# Patient Record
Sex: FEMALE | Race: WHITE | Employment: UNEMPLOYED | URBAN - METROPOLITAN AREA
[De-identification: names, ages, dates, MRNs, and addresses within clinical notes are randomized per-mention and may not be internally consistent; named-entity substitution may affect disease eponyms.]

---

## 2017-05-08 ENCOUNTER — ALLIED HEALTH/NURSE VISIT (OUTPATIENT)
Dept: PEDIATRICS | Facility: CLINIC | Age: 22
End: 2017-05-08
Payer: COMMERCIAL

## 2017-05-08 ENCOUNTER — TELEPHONE (OUTPATIENT)
Dept: PEDIATRICS | Facility: CLINIC | Age: 22
End: 2017-05-08

## 2017-05-08 DIAGNOSIS — Z11.1 SCREENING EXAMINATION FOR PULMONARY TUBERCULOSIS: ICD-10-CM

## 2017-05-08 DIAGNOSIS — Z11.1 VISIT FOR MANTOUX TEST: Primary | ICD-10-CM

## 2017-05-08 PROCEDURE — 99207 ZZC NO CHARGE NURSE ONLY: CPT

## 2017-05-08 NOTE — NURSING NOTE
The patient is asked the following questions today and these are her answers:    -Have you had a mantoux administered in the past 30 days?    No  -Have you had a previous positive Mantoux.  Yes  -Have you received BCG in the past.  No  -Have you had a live vaccine  (MMR, Varicella, OPV, Yellow Fever) in the last 6 weeks.  No  -Have you had and active  viral or bacterial infection in the past 6 weeks.  No  -Have you received corticosteroids or immunosuppressive agents in the past 6 weeks.  No  -Have you been diagnosed with HIV?  No  -Do you have a maglinancy?  No     Radha Esposito CMA

## 2017-05-08 NOTE — PROGRESS NOTES
Katia Cotto comes into clinic today at the request of Dr. Byrd Ordering Provider for PPD placement.        This service provided today was under the supervising provider of the day Dr. Spears, who was available if needed.    Radha Esposito

## 2017-05-08 NOTE — NURSING NOTE
The following medication was given:     MEDICATION: Tuberculin purified Protein Derivative(Mantoux) Tubersol  ROUTE: ID  SITE: LT forearm  DOSE: 0.1 ml  LOT #: [id number]  :  [employer]  EXPIRATION DATE:  11/03/18  NDC#: 82741-301-53   AMOUNT OF MEDICATION GIVEN: 0.1 ml  AMOUNT OF MEDICATION WASTED: 0 mg    Radha Esposito CMA  May 8, 2017

## 2017-05-08 NOTE — MR AVS SNAPSHOT
After Visit Summary   5/8/2017    Katia Cotto    MRN: [id number]           Patient Information     Date Of Birth          1995        Visit Information        Provider Department      5/8/2017 9:00 AM MG ANCILLARY [facility]        Today's Diagnoses     Screening examination for pulmonary tuberculosis    -  1       Follow-ups after your visit        Your next 10 appointments already scheduled     May 10, 2017 10:20 AM CDT   Nurse Only with MG ANCILLARY   [facility] ([facility])    [id number] [place] [address] [phone]            May 22, 2017  1:20 PM CDT   PHYSICAL with Joanne Byrd MD   [facility] ([facility])    [address] [place] [address] [phone]              Who to contact     If you have questions or need follow up information about today's clinic visit or your schedule please contact [facility] directly at [phone].  Normal or non-critical lab and imaging results will be communicated to you by Evim.nethart, letter or phone within 4 business days after the clinic has received the results. If you do not hear from us within 7 days, please contact the clinic through Hello Chairt or phone. If you have a critical or abnormal lab result, we will notify you by phone as soon as possible.  Submit refill requests through [employer] or call your pharmacy and they will forward the refill request to us. Please allow 3 business days for your refill to be completed.          Additional Information About Your Visit        Evim.nethart Information     [employer] gives you secure access to your electronic health record. If you see a primary care provider, you can also send messages to your care team and make appointments. If you have questions, please call your primary care clinic.  If you do not have a primary care provider, please call [phone] and they  will assist you.      [employer] is an electronic gateway that provides easy, online access to your medical records. With [employer], you can request a clinic appointment, read your test results, renew a prescription or communicate with your care team.     To access your existing account, please contact your [facility] or call [phone] for assistance.        Care EveryWhere ID     This is your Care EveryWhere ID. This could be used by other organizations to access your [place] medical records  [id number]         Blood Pressure from Last 3 Encounters:   11/25/16 108/70   03/08/16 118/64   02/09/15 96/68    Weight from Last 3 Encounters:   11/25/16 138 lb 14.4 oz (63 kg) (69 %)*   03/08/16 139 lb (63 kg) (72 %)*   02/09/15 127 lb (57.6 kg) (58 %)*     * Growth percentiles are based on [facility] 2-20 Years data.              Today, you had the following     No orders found for display       Primary Care Provider Office Phone # Fax #    Ann Spears [phone]       [facility] [address]  [facility] [address]        Thank you!     Thank you for choosing [facility]  for your care. Our goal is always to provide you with excellent care. Hearing back from our patients is one way we can continue to improve our services. Please take a few minutes to complete the written survey that you may receive in the mail after your visit with us. Thank you!             Your Updated Medication List - Protect others around you: Learn how to safely use, store and throw away your medicines at www.disposemymeds.org.          This list is accurate as of: 5/8/17  9:31 AM.  Always use your most recent med list.                   Brand Name Dispense Instructions for use    albuterol 108 (90 BASE) MCG/ACT Inhaler    PROAIR HFA/PROVENTIL HFA/VENTOLIN HFA    1 Inhaler    Inhale 2 puffs into the lungs every 4 hours as needed for shortness of breath / dyspnea or wheezing        beclomethasone 80 MCG/ACT Inhaler    QVAR    3 Inhaler    Inhale 2 puffs into the lungs 2 times daily       EXCEDRIN PO      Take 1 tablet by mouth as needed       fexofenadine-pseudoePHEDrine 180-240 MG per 24 hr tablet    ALLEGRA-D 24    90 tablet    Take 1 tablet by mouth daily       IBU PO      prn       norethindrone-ethinyl estradiol 1.5-30 MG-MCG per tablet    MICROGESTIN 1.5/30    63 tablet    Take 1 tablet by mouth daily

## 2017-05-08 NOTE — TELEPHONE ENCOUNTER
[facility] CLINICAL DOCUMENTATION    Form Documentation Form or Letter Request    Type or form/letter needing completion: [facility] Student Immunization Record  Provider: Dr. Byrd  Has provider seen patient for office visit related to reason for form request? Yes  Date form needed: when completed  Once completed: Patient will  at . Call [phone] to inform patient when ready.    Dr. Byrd out of office until 5/15/17. Routed to Dr. Ann Spears for review and sign form.  Jose Montoya, CMA

## 2017-05-08 NOTE — TELEPHONE ENCOUNTER
Left message for patient that the form she dropped off  has been completed and signed by Dr. Spears.  Patient had a PPD placed this morning and the results will need to be documented on the form.  Patient has a PPD reading appt on 05/10/17 so I will keep the form on my desk to give to patient after nurse has documented PPD reading.      Radha Esposito CMA

## 2017-05-10 ENCOUNTER — ALLIED HEALTH/NURSE VISIT (OUTPATIENT)
Dept: PEDIATRICS | Facility: CLINIC | Age: 22
End: 2017-05-10
Payer: COMMERCIAL

## 2017-05-10 DIAGNOSIS — Z11.1 ENCOUNTER FOR READING OF TUBERCULIN SKIN TEST: Primary | ICD-10-CM

## 2017-05-10 LAB
PPDINDURATION: 0 MM (ref 0–5)
PPDREDNESS: 0 MM

## 2017-05-10 PROCEDURE — 86580 TB INTRADERMAL TEST: CPT

## 2017-05-10 PROCEDURE — 99207 ZZC NO CHARGE NURSE ONLY: CPT

## 2017-05-10 NOTE — NURSING NOTE
Katia Cotto comes into clinic today at the request of Dr. SPEARS Ordering Provider for Dr. SPEARS.    Mantou Results:      Mantoux placed at [facility](location) on 5/08/2017 (date) at 0930(time) to left forearm.    Mantoux results read 5/10/2017 (date) at 1020(time).    NEGATIVE. No induration.  No swelling.  No redness.  Induration:  0mm    Plan:  Patient given copy of results.  Johanna Wilkinson RN,   [facility]            This service provided today was under the supervising provider of the day Dr. Spears, who was available if needed.    Johanna Wilkinson

## 2017-05-10 NOTE — MR AVS SNAPSHOT
After Visit Summary   5/10/2017    Katia Cotto    MRN: [id number]           Patient Information     Date Of Birth          1995        Visit Information        Provider Department      5/10/2017 10:20 AM MG ANCILLARY [facility]        Today's Diagnoses     Encounter for reading of tuberculin skin test    -  1       Follow-ups after your visit        Your next 10 appointments already scheduled     May 15, 2017  9:00 AM CDT   Nurse Only with MG ANCILLARY   [facility] ([facility])    [address] [place] [address] [phone]            May 17, 2017  9:00 AM CDT   Nurse Only with MG ANCILLARY   [facility] ([facility])    [address] [place] [id number] [phone]            May 22, 2017  1:20 PM CDT   PHYSICAL with Joanne Byrd MD   [facility])    [address] [place] [address] [phone]              Who to contact     If you have questions or need follow up information about today's clinic visit or your schedule please contact [facility] directly at [phone].  Normal or non-critical lab and imaging results will be communicated to you by MyChart, letter or phone within 4 business days after the clinic has received the results. If you do not hear from us within 7 days, please contact the clinic through MyStore.comhart or phone. If you have a critical or abnormal lab result, we will notify you by phone as soon as possible.  Submit refill requests through [employer] or call your pharmacy and they will forward the refill request to us. Please allow 3 business days for your refill to be completed.          Additional Information About Your Visit        MyStore.[email or web address] Information     [employer] gives you secure access to your electronic health record. If you see a primary care provider, you can also  send messages to your care team and make appointments. If you have questions, please call your primary care clinic.  If you do not have a primary care provider, please call [phone] and they will assist you.      edelight is an electronic gateway that provides easy, online access to your medical records. With edelight, you can request a clinic appointment, read your test results, renew a prescription or communicate with your care team.     To access your existing account, please contact your [facility] or call [phone] for assistance.        Care EveryWhere ID     This is your Care EveryWhere ID. This could be used by other organizations to access your [place] medical records  [id number]         Blood Pressure from Last 3 Encounters:   11/25/16 108/70   03/08/16 118/64   02/09/15 96/68    Weight from Last 3 Encounters:   11/25/16 138 lb 14.4 oz (63 kg) (69 %)*   03/08/16 139 lb (63 kg) (72 %)*   02/09/15 127 lb (57.6 kg) (58 %)*     * Growth percentiles are based on CDC 2-20 Years data.              Today, you had the following     No orders found for display       Primary Care Provider Office Phone # Fax #    Ann Separs [phone]       [facility] [address] [facility] [address]        Thank you!     Thank you for choosing [facility]  for your care. Our goal is always to provide you with excellent care. Hearing back from our patients is one way we can continue to improve our services. Please take a few minutes to complete the written survey that you may receive in the mail after your visit with us. Thank you!             Your Updated Medication List - Protect others around you: Learn how to safely use, store and throw away your medicines at www.disposemymeds.org.          This list is accurate as of: 5/10/17 10:39 AM.  Always use your most recent med list.                   Brand Name Dispense Instructions for use     albuterol 108 (90 BASE) MCG/ACT Inhaler    PROAIR HFA/PROVENTIL HFA/VENTOLIN HFA    1 Inhaler    Inhale 2 puffs into the lungs every 4 hours as needed for shortness of breath / dyspnea or wheezing       beclomethasone 80 MCG/ACT Inhaler    QVAR    3 Inhaler    Inhale 2 puffs into the lungs 2 times daily       EXCEDRIN PO      Take 1 tablet by mouth as needed       fexofenadine-pseudoePHEDrine 180-240 MG per 24 hr tablet    ALLEGRA-D 24    90 tablet    Take 1 tablet by mouth daily       IBU PO      prn       norethindrone-ethinyl estradiol 1.5-30 MG-MCG per tablet    MICROGESTIN 1.5/30    63 tablet    Take 1 tablet by mouth daily

## 2017-05-17 ENCOUNTER — ALLIED HEALTH/NURSE VISIT (OUTPATIENT)
Dept: PEDIATRICS | Facility: CLINIC | Age: 22
End: 2017-05-17
Payer: COMMERCIAL

## 2017-05-17 DIAGNOSIS — Z11.1 SCREENING EXAMINATION FOR PULMONARY TUBERCULOSIS: Primary | ICD-10-CM

## 2017-05-17 PROCEDURE — 86580 TB INTRADERMAL TEST: CPT

## 2017-05-17 PROCEDURE — 99207 ZZC NO CHARGE NURSE ONLY: CPT

## 2017-05-17 NOTE — MR AVS SNAPSHOT
After Visit Summary   5/17/2017    Katia Cotto    MRN: [id number]           Patient Information     Date Of Birth          1995        Visit Information        Provider Department      5/17/2017 9:00 AM MG ANCILLARY [facility]        Today's Diagnoses     Screening examination for pulmonary tuberculosis    -  1       Follow-ups after your visit        Your next 10 appointments already scheduled     May 19, 2017  9:00 AM CDT   Nurse Only with MG ANCILLARY   [facility] ([facility])    [address] [place] [address] [phone]            May 22, 2017  1:20 PM CDT   PHYSICAL with Joanne Byrd MD   [facility] ([facility])    [address] [place] [address] [phone]              Who to contact     If you have questions or need follow up information about today's clinic visit or your schedule please contact [facility] directly at [phone].  Normal or non-critical lab and imaging results will be communicated to you by [employer]T Therapeuticshart, letter or phone within 4 business days after the clinic has received the results. If you do not hear from us within 7 days, please contact the clinic through GoodChime!t or phone. If you have a critical or abnormal lab result, we will notify you by phone as soon as possible.  Submit refill requests through [employer] or call your pharmacy and they will forward the refill request to us. Please allow 3 business days for your refill to be completed.          Additional Information About Your Visit        APT Therapeuticshart Information     [employer] gives you secure access to your electronic health record. If you see a primary care provider, you can also send messages to your care team and make appointments. If you have questions, please call your primary care clinic.  If you do not have a primary care provider, please call [phone] and  they will assist you.      [employer] is an electronic gateway that provides easy, online access to your medical records. With [employer]iva, you can request a clinic appointment, read your test results, renew a prescription or communicate with your care team.     To access your existing account, please contact your [facility] or call [phone] for assistance.        Care EveryWhere ID     This is your Care EveryWhere ID. This could be used by other organizations to access your [place] medical records  [id number]         Blood Pressure from Last 3 Encounters:   11/25/16 108/70   03/08/16 118/64   02/09/15 96/68    Weight from Last 3 Encounters:   11/25/16 138 lb 14.4 oz (63 kg) (69 %)*   03/08/16 139 lb (63 kg) (72 %)*   02/09/15 127 lb (57.6 kg) (58 %)*     * Growth percentiles are based on [facility] 2-20 Years data.              We Performed the Following     TB INTRADERMAL TEST        Primary Care Provider Office Phone # Fax #    Ann Spears [phone]       [facility] [address]  [facility] [address]        Thank you!     Thank you for choosing [facility]  for your care. Our goal is always to provide you with excellent care. Hearing back from our patients is one way we can continue to improve our services. Please take a few minutes to complete the written survey that you may receive in the mail after your visit with us. Thank you!             Your Updated Medication List - Protect others around you: Learn how to safely use, store and throw away your medicines at www.disposemymeds.org.          This list is accurate as of: 5/17/17 10:52 AM.  Always use your most recent med list.                   Brand Name Dispense Instructions for use    albuterol 108 (90 BASE) MCG/ACT Inhaler    PROAIR HFA/PROVENTIL HFA/VENTOLIN HFA    1 Inhaler    Inhale 2 puffs into the lungs every 4 hours as needed for shortness of breath / dyspnea or wheezing        beclomethasone 80 MCG/ACT Inhaler    QVAR    3 Inhaler    Inhale 2 puffs into the lungs 2 times daily       EXCEDRIN PO      Take 1 tablet by mouth as needed       fexofenadine-pseudoePHEDrine 180-240 MG per 24 hr tablet    ALLEGRA-D 24    90 tablet    Take 1 tablet by mouth daily       IBU PO      prn       norethindrone-ethinyl estradiol 1.5-30 MG-MCG per tablet    MICROGESTIN 1.5/30    63 tablet    Take 1 tablet by mouth daily

## 2017-05-17 NOTE — PROGRESS NOTES

## 2017-05-19 ENCOUNTER — ALLIED HEALTH/NURSE VISIT (OUTPATIENT)
Dept: PEDIATRICS | Facility: CLINIC | Age: 22
End: 2017-05-19
Payer: COMMERCIAL

## 2017-05-19 DIAGNOSIS — Z11.1 ENCOUNTER FOR PPD SKIN TEST READING: Primary | ICD-10-CM

## 2017-05-19 LAB
PPDINDURATION: 0 MM (ref 0–5)
PPDREDNESS: 0 MM

## 2017-05-19 PROCEDURE — 99207 ZZC NO CHARGE NURSE ONLY: CPT

## 2017-05-19 NOTE — NURSING NOTE
Katia Cotto comes into clinic today at the request of Dr. Spears Ordering Provider for Dr. Spears.    Mantou Results:      Mantoux placed at [facility](location) on 5/17/2017 (date) at 0915(time) to right forearm.    Mantoux results read 5/19/2017 (date) at 0916(time).    NEGATIVE. No induration.  No swelling.  No redness.  Induration:  0mm      Johanna Wilkinson RN,   [facility]            This service provided today was under the supervising provider of the day Dr. Spears, who was available if needed.    Johanna Wilkinson

## 2017-05-19 NOTE — MR AVS SNAPSHOT
After Visit Summary   5/19/2017    Katia Cotto    MRN: [id number]           Patient Information     Date Of Birth          1995        Visit Information        Provider Department      5/19/2017 9:00 AM MG ANCILLARY [facility]        Today's Diagnoses     Encounter for PPD skin test reading    -  1       Follow-ups after your visit        Your next 10 appointments already scheduled     May 22, 2017  1:20 PM CDT   PHYSICAL with Joanne Byrd MD   [facility] ([facility])    [address] [place] [address] [phone]              Who to contact     If you have questions or need follow up information about today's clinic visit or your schedule please contact [facility] directly at [phone].  Normal or non-critical lab and imaging results will be communicated to you by FidusNethart, letter or phone within 4 business days after the clinic has received the results. If you do not hear from us within 7 days, please contact the clinic through FidusNethart or phone. If you have a critical or abnormal lab result, we will notify you by phone as soon as possible.  Submit refill requests through [employer] or call your pharmacy and they will forward the refill request to us. Please allow 3 business days for your refill to be completed.          Additional Information About Your Visit        FidusNethart Information     [employer] gives you secure access to your electronic health record. If you see a primary care provider, you can also send messages to your care team and make appointments. If you have questions, please call your primary care clinic.  If you do not have a primary care provider, please call [phone] and they will assist you.      Bit[employer]ps is an electronic gateway that provides easy, online access to your medical records. With [employer], you can request a clinic appointment, read your test results, renew a  prescription or communicate with your care team.     To access your existing account, please contact your [facility] or call [phone] for assistance.        Care EveryWhere ID     This is your Care EveryWhere ID. This could be used by other organizations to access your [place] medical records  [id number]         Blood Pressure from Last 3 Encounters:   11/25/16 108/70   03/08/16 118/64   02/09/15 96/68    Weight from Last 3 Encounters:   11/25/16 138 lb 14.4 oz (63 kg) (69 %)*   03/08/16 139 lb (63 kg) (72 %)*   02/09/15 127 lb (57.6 kg) (58 %)*     * Growth percentiles are based on CDC 2-20 Years data.              Today, you had the following     No orders found for display       Primary Care Provider Office Phone # Fax #    Ann Spears [phone]       [facility] [address]  [facility] [address]        Thank you!     Thank you for choosing [facility]  for your care. Our goal is always to provide you with excellent care. Hearing back from our patients is one way we can continue to improve our services. Please take a few minutes to complete the written survey that you may receive in the mail after your visit with us. Thank you!             Your Updated Medication List - Protect others around you: Learn how to safely use, store and throw away your medicines at www.disposemymeds.org.          This list is accurate as of: 5/19/17  9:30 AM.  Always use your most recent med list.                   Brand Name Dispense Instructions for use    albuterol 108 (90 BASE) MCG/ACT Inhaler    PROAIR HFA/PROVENTIL HFA/VENTOLIN HFA    1 Inhaler    Inhale 2 puffs into the lungs every 4 hours as needed for shortness of breath / dyspnea or wheezing       beclomethasone 80 MCG/ACT Inhaler    QVAR    3 Inhaler    Inhale 2 puffs into the lungs 2 times daily       EXCEDRIN PO      Take 1 tablet by mouth as needed       fexofenadine-pseudoePHEDrine  180-240 MG per 24 hr tablet    ALLEGRA-D 24    90 tablet    Take 1 tablet by mouth daily       IBU PO      prn       norethindrone-ethinyl estradiol 1.5-30 MG-MCG per tablet    MICROGESTIN 1.5/30    63 tablet    Take 1 tablet by mouth daily

## 2017-05-22 ENCOUNTER — OFFICE VISIT (OUTPATIENT)
Dept: PEDIATRICS | Facility: CLINIC | Age: 22
End: 2017-05-22
Payer: COMMERCIAL

## 2017-05-22 VITALS
OXYGEN SATURATION: 99 % | SYSTOLIC BLOOD PRESSURE: 96 MMHG | WEIGHT: 149.6 LBS | BODY MASS INDEX: 23.48 KG/M2 | HEART RATE: 100 BPM | HEIGHT: 67 IN | TEMPERATURE: 97.4 F | DIASTOLIC BLOOD PRESSURE: 60 MMHG

## 2017-05-22 DIAGNOSIS — J30.89 SEASONAL ALLERGIC RHINITIS DUE TO OTHER ALLERGIC TRIGGER: ICD-10-CM

## 2017-05-22 DIAGNOSIS — Z00.00 ROUTINE GENERAL MEDICAL EXAMINATION AT A HEALTH CARE FACILITY: Primary | ICD-10-CM

## 2017-05-22 DIAGNOSIS — J45.30 MILD PERSISTENT ASTHMA WITHOUT COMPLICATION: ICD-10-CM

## 2017-05-22 PROCEDURE — 99395 PREV VISIT EST AGE 18-39: CPT | Performed by: FAMILY MEDICINE

## 2017-05-22 RX ORDER — ALBUTEROL SULFATE 90 UG/1
2 AEROSOL, METERED RESPIRATORY (INHALATION) EVERY 4 HOURS PRN
Qty: 1 INHALER | Refills: 5 | Status: SHIPPED | OUTPATIENT
Start: 2017-05-22

## 2017-05-22 ASSESSMENT — PAIN SCALES - GENERAL: PAINLEVEL: NO PAIN (0)

## 2017-05-22 NOTE — LETTER
My Asthma Action Plan  Name: Katia Cotto   YOB: 1995  Date: 5/22/2017   My doctor: Ann Spears   My clinic: [facility]      My Control Medicine: qvar        Dose:   My Rescue Medicine: Albuterol        Dose:    My Asthma Severity: mild persistent  Avoid your asthma triggers: upper respiratory infections        GREEN ZONE   Good Control    I feel good    No cough or wheeze    Can work, sleep and play without asthma symptoms       Take your asthma control medicine every day.     1. If exercise triggers your asthma, take your rescue medication    15 minutes before exercise or sports, and    During exercise if you have asthma symptoms  2. Spacer to use with inhaler: If you have a spacer, make sure to use it with your inhaler             YELLOW ZONE Getting Worse  I have ANY of these:    I do not feel good    Cough or wheeze    Chest feels tight    Wake up at night   1. Keep taking your Green Zone medications  2. Start taking your rescue medicine:    every 20 minutes for up to 1 hour. Then every 4 hours for 24-48 hours.  3. If you stay in the Yellow Zone for more than 12-24 hours, contact your doctor.  4. If you do not return to the Green Zone in 12-24 hours or you get worse, start taking your oral steroid medicine if prescribed by your provider.           RED ZONE Medical Alert - Get Help  I have ANY of these:    I feel awful    Medicine is not helping    Breathing getting harder    Trouble walking or talking    Nose opens wide to breathe       1. Take your rescue medicine NOW  2. If your provider has prescribed an oral steroid medicine, start taking it NOW  3. Call your doctor NOW  4. If you are still in the Red Zone after 20 minutes and you have not reached your doctor:    Take your rescue medicine again and    Call 911 or go to the emergency room right away    See your regular doctor within 2 weeks of an Emergency Room or Urgent Care visit for follow-up treatment.        The  above medication may be given at school or day care?: N/A (Adult Patient)  Child can carry and use inhaler(s) at school with approval of school nurse?: N/A (Adult Patient)    Electronically signed by: Joanne Byrd, May 22, 2017    Annual Reminders:  Meet with Asthma Educator,  Flu Shot in the Fall, consider Pneumonia Vaccination for patients with asthma (aged 19 and older).    Pharmacy: [facility] [id number] [place] [address]                    Asthma Triggers  How To Control Things That Make Your Asthma Worse    Triggers are things that make your asthma worse.  Look at the list below to help you find your triggers and what you can do about them.  You can help prevent asthma flare-ups by staying away from your triggers.      Trigger                                                          What you can do   Cigarette Smoke  Tobacco smoke can make asthma worse. Do not allow smoking in your home, car or around you.  Be sure no one smokes at a child s day care or school.  If you smoke, ask your health care provider for ways to help you quit.  Ask family members to quit too.  Ask your health care provider for a referral to Quit Plan to help you quit smoking, or call 8-185-817-PLAN.     Colds, Flu, Bronchitis  These are common triggers of asthma. Wash your hands often.  Don t touch your eyes, nose or mouth.  Get a flu shot every year.     Dust Mites  These are tiny bugs that live in cloth or carpet. They are too small to see. Wash sheets and blankets in hot water every week.   Encase pillows and mattress in dust mite proof covers.  Avoid having carpet if you can. If you have carpet, vacuum weekly.   Use a dust mask and HEPA vacuum.   Pollen and Outdoor Mold  Some people are allergic to trees, grass, or weed pollen, or molds. Try to keep your windows closed.  Limit time out doors when pollen count is high.   Ask you health care provider about taking medicine during allergy season.     Animal  Dander  Some people are allergic to skin flakes, urine or saliva from pets with fur or feathers. Keep pets with fur or feathers out of your home.    If you can t keep the pet outdoors, then keep the pet out of your bedroom.  Keep the bedroom door closed.  Keep pets off cloth furniture and away from stuffed toys.     Mice, Rats, and Cockroaches  Some people are allergic to the waste from these pests.   Cover food and garbage.  Clean up spills and food crumbs.  Store grease in the refrigerator.   Keep food out of the bedroom.   Indoor Mold  This can be a trigger if your home has high moisture. Fix leaking faucets, pipes, or other sources of water.   Clean moldy surfaces.  Dehumidify basement if it is damp and smelly.   Smoke, Strong Odors, and Sprays  These can reduce air quality. Stay away from strong odors and sprays, such as perfume, powder, hair spray, paints, smoke incense, paint, cleaning products, candles and new carpet.   Exercise or Sports  Some people with asthma have this trigger. Be active!  Ask your doctor about taking medicine before sports or exercise to prevent symptoms.    Warm up for 5-10 minutes before and after sports or exercise.     Other Triggers of Asthma  Cold air:  Cover your nose and mouth with a scarf.  Sometimes laughing or crying can be a trigger.  Some medicines and food can trigger asthma.

## 2017-05-22 NOTE — NURSING NOTE
"Chief Complaint   Patient presents with     Physical     Forms       Initial BP 96/60  Pulse 100  Temp 97.4  F (36.3  C) (Oral)  Ht 5' 7\" (1.702 m)  Wt 149 lb 9.6 oz (67.9 kg)  LMP 05/06/2017 (Exact Date)  SpO2 99%  BMI 23.43 kg/m2 Estimated body mass index is 23.43 kg/(m^2) as calculated from the following:    Height as of this encounter: 5' 7\" (1.702 m).    Weight as of this encounter: 149 lb 9.6 oz (67.9 kg).  BP completed using cuff size: layne Montoya CMA    "

## 2017-05-22 NOTE — MR AVS SNAPSHOT
After Visit Summary   5/22/2017    Katia Cotto    MRN: [id number]           Patient Information     Date Of Birth          1995        Visit Information        Provider Department      5/22/2017 1:20 PM Joanne Byrd MD [facility]        Today's Diagnoses     Routine general medical examination at a health care facility    -  1    Mild persistent asthma without complication        Seasonal allergic rhinitis due to other allergic trigger          Care Instructions      Preventive Health Recommendations  Female Ages 18 to 25     Yearly exam:     See your health care provider every year in order to  o Review health changes.   o Discuss preventive care.    o Review your medicines if your doctor has prescribed any.      You should be tested each year for STDs (sexually transmitted diseases).       After age 20, talk to your provider about how often you should have cholesterol testing.      Starting at age 21, get a Pap test every three years. If you have an abnormal result, your doctor may have you test more often.      If you are at risk for diabetes, you should have a diabetes test (fasting glucose).     Shots:     Get a flu shot each year.     Get a tetanus shot every 10 years.     Consider getting the shot (vaccine) that prevents cervical cancer (Gardasil).    Nutrition:     Eat at least 5 servings of fruits and vegetables each day.    Eat whole-grain bread, whole-wheat pasta and brown rice instead of white grains and rice.    Talk to your provider about Calcium and Vitamin D.     Lifestyle    Exercise at least 150 minutes a week each week (30 minutes a day, 5 days a week). This will help you control your weight and prevent disease.    Limit alcohol to one drink per day.    No smoking.     Wear sunscreen to prevent skin cancer.    See your dentist every six months for an exam and cleaning.        Follow-ups after your visit        Who to contact     If you have  "questions or need follow up information about today's clinic visit or your schedule please contact [facility] directly at [phone].  Normal or non-critical lab and imaging results will be communicated to you by [employer], letter or phone within 4 business days after the clinic has received the results. If you do not hear from us within 7 days, please contact the clinic through [employer] or phone. If you have a critical or abnormal lab result, we will notify you by phone as soon as possible.  Submit refill requests through [employer] or call your pharmacy and they will forward the refill request to us. Please allow 3 business days for your refill to be completed.          Additional Information About Your Visit        [employer] Information     [employer] gives you secure access to your electronic health record. If you see a primary care provider, you can also send messages to your care team and make appointments. If you have questions, please call your primary care clinic.  If you do not have a primary care provider, please call [phone] and they will assist you.      [employer] is an electronic gateway that provides easy, online access to your medical records. With [employer], you can request a clinic appointment, read your test results, renew a prescription or communicate with your care team.     To access your existing account, please contact your [facility] or call [phone] for assistance.        Care EveryWhere ID     This is your Care EveryWhere ID. This could be used by other organizations to access your [facility] medical records  [id number]        Your Vitals Were     Pulse Temperature Height Last Period Pulse Oximetry BMI (Body Mass Index)    100 97.4  F (36.3  C) (Oral) 5' 7\" (1.702 m) 05/06/2017 (Exact Date) 99% 23.43 kg/m2       Blood Pressure from Last 3 Encounters:   05/22/17 96/60   11/25/16 108/70   03/08/16 118/64    Weight from Last 3 Encounters: "   05/22/17 149 lb 9.6 oz (67.9 kg) (80 %)*   11/25/16 138 lb 14.4 oz (63 kg) (69 %)*   03/08/16 139 lb (63 kg) (72 %)*     * Growth percentiles are based on [facility][note 2] 2-20 Years data.              Today, you had the following     No orders found for display         Where to get your medicines      These medications were sent to [place] [id number] [place] [facility] - [place], [place] - [address]  [address] [place] [address]     Phone:  [phone]     albuterol 108 (90 BASE) MCG/ACT Inhaler    beclomethasone 80 MCG/ACT Inhaler          Primary Care Provider Office Phone # Fax #    Ann Spears [phone]       [facility] [address]  [facility] [address]        Thank you!     Thank you for choosing [facility]  for your care. Our goal is always to provide you with excellent care. Hearing back from our patients is one way we can continue to improve our services. Please take a few minutes to complete the written survey that you may receive in the mail after your visit with us. Thank you!             Your Updated Medication List - Protect others around you: Learn how to safely use, store and throw away your medicines at www.disposemymeds.org.          This list is accurate as of: 5/22/17  1:44 PM.  Always use your most recent med list.                   Brand Name Dispense Instructions for use    albuterol 108 (90 BASE) MCG/ACT Inhaler    PROAIR HFA/PROVENTIL HFA/VENTOLIN HFA    1 Inhaler    Inhale 2 puffs into the lungs every 4 hours as needed for shortness of breath / dyspnea or wheezing       beclomethasone 80 MCG/ACT Inhaler    QVAR    3 Inhaler    Inhale 2 puffs into the lungs 2 times daily       EXCEDRIN PO      Take 1 tablet by mouth as needed       fexofenadine-pseudoePHEDrine 180-240 MG per 24 hr tablet    ALLEGRA-D 24    90 tablet    Take 1 tablet by mouth daily       norethindrone-ethinyl estradiol 1.5-30 MG-MCG per tablet    MICROGESTIN 1.5/30    63 tablet    Take 1  tablet by mouth daily

## 2017-05-22 NOTE — PROGRESS NOTES
SUBJECTIVE:     CC: Katia Cotto is an 21 year old woman who presents for preventive health visit.     Healthy Habits:    Do you get at least three servings of calcium containing foods daily (dairy, green leafy vegetables, etc.)? yes    Amount of exercise or daily activities, outside of work: none    Problems taking medications regularly No    Medication side effects: No    Have you had an eye exam in the past two years? yes    Do you see a dentist twice per year? yes    Do you have sleep apnea, excessive snoring or daytime drowsiness?no        Asthma Follow-Up    Was ACT completed today?    Yes    ACT Total Scores 11/25/2016   ACT TOTAL SCORE (Goal Greater than or Equal to 20) 24   In the past 12 months, how many times did you visit the emergency room for your asthma without being admitted to the hospital? 0   In the past 12 months, how many times were you hospitalized overnight because of your asthma? 0       Recent asthma triggers that patient is dealing with: pollens        Today's PHQ-2 Score:   PHQ-2 ( 1999 Pfizer) 5/22/2017   Q1: Little interest or pleasure in doing things 0   Q2: Feeling down, depressed or hopeless 0   PHQ-2 Score 0       Abuse: Current or Past(Physical, Sexual or Emotional)- No  Do you feel safe in your environment - Yes    Social History   Substance Use Topics     Smoking status: Never Smoker     Smokeless tobacco: Never Used     Alcohol use No     The patient does not drink >3 drinks per day nor >7 drinks per week.    No results for input(s): CHOL, HDL, LDL, TRIG, CHOLHDLRATIO, NHDL in the last 98359 hours.    Reviewed orders with patient.  Reviewed health maintenance and updated orders accordingly - Yes    Mammo Decision Support:  Mammogram not appropriate for this patient based on age.    Pertinent mammograms are reviewed under the imaging tab.  History of abnormal Pap smear: NO - under age 21, PAP not appropriate for age    Reviewed and updated as needed this visit by  clinical staff  Tobacco  Allergies  Meds  Med Hx  Surg Hx  Fam Hx  Soc Hx        Reviewed and updated as needed this visit by Provider          Past Medical History:   Diagnosis Date     Humeral shaft fracture 6/15/14    No surgery, [facility]     Pneumomediastinum (H) 4/28/13    most likely from violent cough       History reviewed. No pertinent surgical history.  Obstetric History     No data available          ROS:  C: NEGATIVE for fever, chills, change in weight  I: NEGATIVE for worrisome rashes, moles or lesions  E: NEGATIVE for vision changes or irritation  ENT: NEGATIVE for ear, mouth and throat problems  ENT: History of allergies  R: NEGATIVE for significant cough or SOB  RESP:Hx asthma  B: NEGATIVE for masses, tenderness or discharge  CV: NEGATIVE for chest pain, palpitations or peripheral edema  GI: NEGATIVE for nausea, abdominal pain, heartburn, or change in bowel habits  : NEGATIVE for unusual urinary or vaginal symptoms. Periods are regular.  M: NEGATIVE for significant arthralgias or myalgia  N: NEGATIVE for weakness, dizziness or paresthesias  E: NEGATIVE for temperature intolerance, skin/hair changes  H: NEGATIVE for bleeding problems  P: NEGATIVE for changes in mood or affect    Problem list, Medication list, Allergies, and Medical/Social/Surgical histories reviewed in [facility] and updated as appropriate.  Labs reviewed in EPIC  BP Readings from Last 3 Encounters:   05/22/17 96/60   11/25/16 108/70   03/08/16 118/64    Wt Readings from Last 3 Encounters:   05/22/17 149 lb 9.6 oz (67.9 kg) (80 %)*   11/25/16 138 lb 14.4 oz (63 kg) (69 %)*   03/08/16 139 lb (63 kg) (72 %)*     * Growth percentiles are based on CDC 2-20 Years data.                  Patient Active Problem List   Diagnosis     Headache     Allergic rhinitis due to other allergen     Seasonal allergies     Intermittent asthma     Mild persistent asthma without complication     History reviewed. No pertinent surgical history.   "  Social History   Substance Use Topics     Smoking status: Never Smoker     Smokeless tobacco: Never Used     Alcohol use No     Family History   Problem Relation Age of Onset     Asthma Father      smoker     CANCER Maternal Grandfather      intestinal cancer     Neurologic Disorder Paternal Grandmother      MS         Current Outpatient Prescriptions   Medication Sig Dispense Refill     albuterol (PROAIR HFA/PROVENTIL HFA/VENTOLIN HFA) 108 (90 BASE) MCG/ACT Inhaler Inhale 2 puffs into the lungs every 4 hours as needed for shortness of breath / dyspnea or wheezing 1 Inhaler 5     beclomethasone (QVAR) 80 MCG/ACT Inhaler Inhale 2 puffs into the lungs 2 times daily 3 Inhaler 1     fexofenadine-pseudoePHEDrine (ALLEGRA-D 24) 180-240 MG per tablet Take 1 tablet by mouth daily 90 tablet 3     Aspirin-Acetaminophen-Caffeine (EXCEDRIN PO) Take 1 tablet by mouth as needed       norethindrone-ethinyl estradiol (MICROGESTIN 1.5/30) 1.5-30 MG-MCG per tablet Take 1 tablet by mouth daily (Patient not taking: Reported on 5/22/2017) 63 tablet 3     [DISCONTINUED] albuterol (PROAIR HFA, PROVENTIL HFA, VENTOLIN HFA) 108 (90 BASE) MCG/ACT inhaler Inhale 2 puffs into the lungs every 4 hours as needed for shortness of breath / dyspnea or wheezing 1 Inhaler 5     [DISCONTINUED] beclomethasone (QVAR) 80 MCG/ACT Inhaler Inhale 2 puffs into the lungs 2 times daily 3 Inhaler 3     No Known Allergies  Recent Labs   Lab Test  11/25/16   1126   TSH  2.24      OBJECTIVE:     BP 96/60  Pulse 100  Temp 97.4  F (36.3  C) (Oral)  Ht 5' 7\" (1.702 m)  Wt 149 lb 9.6 oz (67.9 kg)  LMP 05/06/2017 (Exact Date)  SpO2 99%  BMI 23.43 kg/m2  EXAM:  GENERAL: healthy, alert and no distress  EYES: Eyes grossly normal to inspection, PERRL and conjunctivae and sclerae normal  HENT: ear canals and TM's normal, nose and mouth without ulcers or lesions  NECK: no adenopathy, no asymmetry, masses, or scars and thyroid normal to palpation  RESP: lungs clear to " auscultation - no rales, rhonchi or wheezes  BREAST: normal without masses, tenderness or nipple discharge and no palpable axillary masses or adenopathy  CV: regular rate and rhythm, normal S1 S2, no S3 or S4, no murmur, click or rub, no peripheral edema and peripheral pulses strong  ABDOMEN: soft, nontender, no hepatosplenomegaly, no masses and bowel sounds normal   (female): deferred  MS: no gross musculoskeletal defects noted, no edema  SKIN: no suspicious lesions or rashes  NEURO: Normal strength and tone, mentation intact and speech normal  PSYCH: mentation appears normal, affect normal/bright    ASSESSMENT/PLAN:     1. Routine general medical examination at a health care facility  Discussed on regular exercises, healthy eating, self breast exams monthly and routine dental checks.  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 Mild persistent asthma without complication  Stable, continue qvar inhaler, follow-up for recheck in 6 months  - albuterol (PROAIR HFA/PROVENTIL HFA/VENTOLIN HFA) 108 (90 BASE) MCG/ACT Inhaler; Inhale 2 puffs into the lungs every 4 hours as needed for shortness of breath / dyspnea or wheezing  Dispense: 1 Inhaler; Refill: 5  - beclomethasone (QVAR) 80 MCG/ACT Inhaler; Inhale 2 puffs into the lungs 2 times daily  Dispense: 3 Inhaler; Refill: 1  - Asthma Action Plan (AAP)    3. Seasonal allergic rhinitis due to other allergic trigger  Continue over-the-counter antihistamines p.r.n.   Avoid potential allergen triggers      COUNSELING:   Reviewed preventive health counseling, as reflected in patient instructions  Special attention given to:        Regular exercise       Healthy diet/nutrition       Contraception       Family planning       Folic Acid Counseling       Safe sex practices/STD prevention         reports that she has never smoked. She has never used smokeless tobacco.    Estimated body mass index is 23.43 kg/(m^2) as calculated from the following:    Height as of  "this encounter: 5' 7\" (1.702 m).    Weight as of this encounter: 149 lb 9.6 oz (67.9 kg).       Counseling Resources:  ATP IV Guidelines  Pooled Cohorts Equation Calculator  Breast Cancer Risk Calculator  FRAX Risk Assessment  ICSI Preventive Guidelines  Dietary Guidelines for Americans, 2010  USDA's MyPlate  ASA Prophylaxis  Lung CA Screening    Joanne Byrd MD  [facility]  Chart documentation done in part with Dragon Voice recognition Software. Although reviewed after completion, some word and grammatical error may remain.  Chart forwarded to PCP for FYI     "

## 2017-05-23 ASSESSMENT — ASTHMA QUESTIONNAIRES: ACT_TOTALSCORE: 22

## 2018-05-14 ENCOUNTER — ALLIED HEALTH/NURSE VISIT (OUTPATIENT)
Dept: PEDIATRICS | Facility: CLINIC | Age: 23
End: 2018-05-14
Payer: COMMERCIAL

## 2018-05-14 DIAGNOSIS — Z11.1 SCREENING EXAMINATION FOR PULMONARY TUBERCULOSIS: Primary | ICD-10-CM

## 2018-05-14 PROCEDURE — 86580 TB INTRADERMAL TEST: CPT

## 2018-05-14 PROCEDURE — 99207 ZZC NO CHARGE NURSE ONLY: CPT

## 2018-05-14 NOTE — MR AVS SNAPSHOT
After Visit Summary   5/14/2018    Katia Cotto    MRN: [id number]           Patient Information     Date Of Birth          1995        Visit Information        Provider Department      5/14/2018 9:00 AM MG ANCILLARY [facility]        Today's Diagnoses     Screening examination for pulmonary tuberculosis    -  1       Follow-ups after your visit        Your next 10 appointments already scheduled     May 16, 2018  4:00 PM CDT   Nurse Only with MG ANCILLARY   [facility] ([facility])    [address] [place] [id number] [phone]              Who to contact     If you have questions or need follow up information about today's clinic visit or your schedule please contact [facility] directly at [phone].  Normal or non-critical lab and imaging results will be communicated to you by [employer], letter or phone within 4 business days after the clinic has received the results. If you do not hear from us within 7 days, please contact the clinic through [employer] or phone. If you have a critical or abnormal lab result, we will notify you by phone as soon as possible.  Submit refill requests through [employer] or call your pharmacy and they will forward the refill request to us. Please allow 3 business days for your refill to be completed.          Additional Information About Your Visit        [employer] Information     [employer] gives you secure access to your electronic health record. If you see a primary care provider, you can also send messages to your care team and make appointments. If you have questions, please call your primary care clinic.  If you do not have a primary care provider, please call [phone] and they will assist you.      [employer] is an electronic gateway that provides easy, online access to your medical records. With [employer], you can request a clinic appointment, read your test results, renew a  prescription or communicate with your care team.     To access your existing account, please contact your [facility] or call [phone] for assistance.        Care EveryWhere ID     This is your Care EveryWhere ID. This could be used by other organizations to access your [place] medical records  [id number]         Blood Pressure from Last 3 Encounters:   05/22/17 96/60   11/25/16 108/70   03/08/16 118/64    Weight from Last 3 Encounters:   05/22/17 149 lb 9.6 oz (67.9 kg) (80 %)*   11/25/16 138 lb 14.4 oz (63 kg) (69 %)*   03/08/16 139 lb (63 kg) (72 %)*     * Growth percentiles are based on CDC 2-20 Years data.              We Performed the Following     TB INTRADERMAL TEST        Primary Care Provider Office Phone # Fax #    Ann Spears MD PhD [phone]       [address]  [facility] [address]        Equal Access to Services     [facility] : Hadii aad ku hadasho Soomaali, waaxda luqadaha, qaybta kaalmada adeegyada, waxay idiin haygreyn brittanyeg davey mo . So [facility] [phone].    ATENCIÓN: Si habla español, tiene a dyer disposición servicios gratuitos de asistencia lingüística. Llame al [phone].    We comply with applicable federal civil rights laws and [place] laws. We do not discriminate on the basis of race, color, national origin, age, disability, sex, sexual orientation, or gender identity.            Thank you!     Thank you for choosing [facility]  for your care. Our goal is always to provide you with excellent care. Hearing back from our patients is one way we can continue to improve our services. Please take a few minutes to complete the written survey that you may receive in the mail after your visit with us. Thank you!             Your Updated Medication List - Protect others around you: Learn how to safely use, store and throw away your medicines at www.disposemymeds.org.          This list is accurate as of 5/14/18  9:03  AM.  Always use your most recent med list.                   Brand Name Dispense Instructions for use Diagnosis    albuterol 108 (90 Base) MCG/ACT Inhaler    PROAIR HFA/PROVENTIL HFA/VENTOLIN HFA    1 Inhaler    Inhale 2 puffs into the lungs every 4 hours as needed for shortness of breath / dyspnea or wheezing    Mild persistent asthma without complication       beclomethasone 80 MCG/ACT Inhaler    QVAR    3 Inhaler    Inhale 2 puffs into the lungs 2 times daily    Mild persistent asthma without complication       EXCEDRIN PO      Take 1 tablet by mouth as needed        fexofenadine-pseudoePHEDrine 180-240 MG per 24 hr tablet    ALLEGRA-D 24    90 tablet    Take 1 tablet by mouth daily    Allergic rhinitis due to other allergen, Seasonal allergies       norethindrone-ethinyl estradiol 1.5-30 MG-MCG per tablet    MICROGESTIN 1.5/30    63 tablet    Take 1 tablet by mouth daily    DUB (dysfunctional uterine bleeding), Encounter for surveillance of contraceptive pills

## 2018-05-14 NOTE — PROGRESS NOTES

## 2018-05-16 ENCOUNTER — ALLIED HEALTH/NURSE VISIT (OUTPATIENT)
Dept: PEDIATRICS | Facility: CLINIC | Age: 23
End: 2018-05-16
Payer: COMMERCIAL

## 2018-05-16 DIAGNOSIS — Z11.1 ENCOUNTER FOR READING OF TUBERCULIN SKIN TEST: Primary | ICD-10-CM

## 2018-05-16 LAB
PPDINDURATION: 0 MM (ref 0–5)
PPDREDNESS: 0 MM

## 2018-05-16 NOTE — MR AVS SNAPSHOT
After Visit Summary   5/16/2018    Katia Cotto    MRN: [id number]           Patient Information     Date Of Birth          1995        Visit Information        Provider Department      5/16/2018 4:00 PM MG ANCILLARY [facility]        Today's Diagnoses     Encounter for reading of tuberculin skin test    -  1       Follow-ups after your visit        Who to contact     If you have questions or need follow up information about today's clinic visit or your schedule please contact [facility] directly at [phone].  Normal or non-critical lab and imaging results will be communicated to you by [employer], letter or phone within 4 business days after the clinic has received the results. If you do not hear from us within 7 days, please contact the clinic through [employer] or phone. If you have a critical or abnormal lab result, we will notify you by phone as soon as possible.  Submit refill requests through [employer] or call your pharmacy and they will forward the refill request to us. Please allow 3 business days for your refill to be completed.          Additional Information About Your Visit        MyChart Information     [employer] gives you secure access to your electronic health record. If you see a primary care provider, you can also send messages to your care team and make appointments. If you have questions, please call your primary care clinic.  If you do not have a primary care provider, please call [phone] and they will assist you.      [employer] is an electronic gateway that provides easy, online access to your medical records. With [employer], you can request a clinic appointment, read your test results, renew a prescription or communicate with your care team.     To access your existing account, please contact your [facility] or call [phone] for assistance.        Care EveryWhere ID     This is your Care EveryWhere ID.  This could be used by other organizations to access your [place] medical records  [id number]         Blood Pressure from Last 3 Encounters:   05/22/17 96/60   11/25/16 108/70   03/08/16 118/64    Weight from Last 3 Encounters:   05/22/17 149 lb 9.6 oz (67.9 kg) (80 %)*   11/25/16 138 lb 14.4 oz (63 kg) (69 %)*   03/08/16 139 lb (63 kg) (72 %)*     * Growth percentiles are based on [facility] 2-20 Years data.              Today, you had the following     No orders found for display       Primary Care Provider Office Phone # Fax #    Ann Spears MD [facility] [phone]       [address] [facility] [address]        Equal Access to Services     [facility] : Hadii aad ku hadasho [place], waaxda luqadaha, qaybta kaalmada adeegyada, megha mo . So [facility] [phone].    ATENCIÓN: Si habla español, tiene a dyer disposición servicios gratuitos de asistencia lingüística. Kikeame al [phone].    We comply with applicable federal civil rights laws and [place] laws. We do not discriminate on the basis of race, color, national origin, age, disability, sex, sexual orientation, or gender identity.            Thank you!     Thank you for choosing [facility]  for your care. Our goal is always to provide you with excellent care. Hearing back from our patients is one way we can continue to improve our services. Please take a few minutes to complete the written survey that you may receive in the mail after your visit with us. Thank you!             Your Updated Medication List - Protect others around you: Learn how to safely use, store and throw away your medicines at www.disposemymeds.org.          This list is accurate as of 5/16/18  4:08 PM.  Always use your most recent med list.                   Brand Name Dispense Instructions for use Diagnosis    albuterol 108 (90 Base) MCG/ACT Inhaler    PROAIR HFA/PROVENTIL HFA/VENTOLIN HFA    1 Inhaler    Inhale 2 puffs into the  lungs every 4 hours as needed for shortness of breath / dyspnea or wheezing    Mild persistent asthma without complication       beclomethasone 80 MCG/ACT Inhaler    QVAR    3 Inhaler    Inhale 2 puffs into the lungs 2 times daily    Mild persistent asthma without complication       EXCEDRIN PO      Take 1 tablet by mouth as needed        fexofenadine-pseudoePHEDrine 180-240 MG per 24 hr tablet    ALLEGRA-D 24    90 tablet    Take 1 tablet by mouth daily    Allergic rhinitis due to other allergen, Seasonal allergies       norethindrone-ethinyl estradiol 1.5-30 MG-MCG per tablet    MICROGESTIN 1.5/30    63 tablet    Take 1 tablet by mouth daily    DUB (dysfunctional uterine bleeding), Encounter for surveillance of contraceptive pills

## 2018-05-16 NOTE — PROGRESS NOTES
Katia Cotto comes into clinic today at the request of Regan Mendoza Ordering Provider for mantoux results reading.        This service provided today was under the supervising provider of the day Dr. Spears, who was available if needed.    Johanna Marcelo Results:      Mantoux placed at [facility](location) on 5/14/2018 (date) at 0855(time) to right forearm.    Mantoux results read 5/16/2018 (date) at 1603(time).    NEGATIVE. No induration.  No swelling.  No redness.  Induration:  0mm    Plan:  Patient given results of negative mantoux.    Johanna Wilkinson RN,   [facility]

## 2018-09-09 ENCOUNTER — HEALTH MAINTENANCE LETTER (OUTPATIENT)
Age: 23
End: 2018-09-09

## 2018-12-24 ENCOUNTER — ANCILLARY PROCEDURE (OUTPATIENT)
Dept: GENERAL RADIOLOGY | Facility: CLINIC | Age: 23
End: 2018-12-24
Attending: FAMILY MEDICINE
Payer: COMMERCIAL

## 2018-12-24 ENCOUNTER — OFFICE VISIT (OUTPATIENT)
Dept: PEDIATRICS | Facility: CLINIC | Age: 23
End: 2018-12-24
Payer: COMMERCIAL

## 2018-12-24 VITALS
SYSTOLIC BLOOD PRESSURE: 120 MMHG | HEIGHT: 67 IN | HEART RATE: 92 BPM | DIASTOLIC BLOOD PRESSURE: 75 MMHG | OXYGEN SATURATION: 97 % | TEMPERATURE: 98 F | WEIGHT: 153.1 LBS | BODY MASS INDEX: 24.03 KG/M2

## 2018-12-24 DIAGNOSIS — R09.1 PLEURISY: ICD-10-CM

## 2018-12-24 DIAGNOSIS — J18.9 PNEUMONIA OF RIGHT LOWER LOBE DUE TO INFECTIOUS ORGANISM: ICD-10-CM

## 2018-12-24 DIAGNOSIS — J18.9 PNEUMONIA OF RIGHT LOWER LOBE DUE TO INFECTIOUS ORGANISM: Primary | ICD-10-CM

## 2018-12-24 DIAGNOSIS — J45.30 MILD PERSISTENT ASTHMA WITHOUT COMPLICATION: ICD-10-CM

## 2018-12-24 PROCEDURE — 71046 X-RAY EXAM CHEST 2 VIEWS: CPT | Performed by: RADIOLOGY

## 2018-12-24 PROCEDURE — 99214 OFFICE O/P EST MOD 30 MIN: CPT | Performed by: FAMILY MEDICINE

## 2018-12-24 RX ORDER — NORGESTIMATE AND ETHINYL ESTRADIOL 7DAYSX3 28
1 KIT ORAL DAILY
COMMUNITY

## 2018-12-24 ASSESSMENT — MIFFLIN-ST. JEOR: SCORE: 1492.09

## 2018-12-24 ASSESSMENT — PAIN SCALES - GENERAL: PAINLEVEL: NO PAIN (0)

## 2018-12-24 NOTE — PROGRESS NOTES
SUBJECTIVE:   Katia Cotto is a 23 year old female who presents to clinic today for the following health issues:    ED/UC Followup:  Patient with past medical history significant for mild persistent asthma asthma, allergies is here for the follow-up on her ER visit after having a diagnosis of right middle lobe pneumonia  Patient states she went to the [facility] on December 14 with pleuritic chest pain.    Patient was found to have elevated d-dimer.    Since she was on hormonal contraception, she was recommended to have a chest CT with angiogram which did not show any concern for pulmonary embolism but right middle lobe consolidation.    Patient was treated with Z-Amador and 10 days of Omnicef  She denies current concerns for cough, shortness of breath, fever, chills, fatigue.  She noted her appetite has improved.    Patient continues to have some pain while coughing and avoid stretching the right ribs  Denies family or personal history of clotting disorder  Denies concerns for chest pain, hemoptysis, leg pain, leg swelling  Patient denies history of smoking    Facility:  [facility]  Date of visit: 12/14/2018  Reason for visit: Pneumonia  Current Status: improved, occasional rib pain             Problem list and histories reviewed & adjusted, as indicated.  Additional history: as documented    Patient Active Problem List   Diagnosis     Headache     Allergic rhinitis due to other allergen     Seasonal allergies     Intermittent asthma     Mild persistent asthma without complication     History reviewed. No pertinent surgical history.    Social History     Tobacco Use     Smoking status: Never Smoker     Smokeless tobacco: Never Used   Substance Use Topics     Alcohol use: No     Family History   Problem Relation Age of Onset     Asthma Father         smoker     Cancer Maternal Grandfather         intestinal cancer     Neurologic Disorder Paternal Grandmother         MS         Current  Outpatient Medications   Medication Sig Dispense Refill     albuterol (PROAIR HFA/PROVENTIL HFA/VENTOLIN HFA) 108 (90 BASE) MCG/ACT Inhaler Inhale 2 puffs into the lungs every 4 hours as needed for shortness of breath / dyspnea or wheezing 1 Inhaler 5     Aspirin-Acetaminophen-Caffeine (EXCEDRIN PO) Take 1 tablet by mouth as needed       fexofenadine-pseudoePHEDrine (ALLEGRA-D 24) 180-240 MG per tablet Take 1 tablet by mouth daily 90 tablet 3     norgestim-eth estrad triphasic (ORTHO TRI-CYCLEN, 28,) 0.18/0.215/0.25 MG-35 MCG tablet Take 1 tablet by mouth daily       beclomethasone (QVAR) 80 MCG/ACT Inhaler Inhale 2 puffs into the lungs 2 times daily (Patient not taking: Reported on 12/24/2018) 3 Inhaler 1     norethindrone-ethinyl estradiol (MICROGESTIN 1.5/30) 1.5-30 MG-MCG per tablet Take 1 tablet by mouth daily (Patient not taking: Reported on 5/22/2017) 63 tablet 3     No Known Allergies  Recent Labs   Lab Test 11/25/16  1126   TSH 2.24      BP Readings from Last 3 Encounters:   12/24/18 120/75   05/22/17 96/60 (<1 %/ 16 %)*   11/25/16 108/70 (26 %/ 66 %)*     *BP percentiles are based on the August 2017 AAP Clinical Practice Guideline for girls    Wt Readings from Last 3 Encounters:   12/24/18 69.4 kg (153 lb 1.6 oz)   05/22/17 67.9 kg (149 lb 9.6 oz) (80 %)*   11/25/16 63 kg (138 lb 14.4 oz) (69 %)*     * Growth percentiles are based on CDC (Girls, 2-20 Years) data.                  Labs reviewed in EPIC    Reviewed and updated as needed this visit by clinical staff       Reviewed and updated as needed this visit by Provider         ROS:  CONSTITUTIONAL: NEGATIVE for fever, chills, change in weight  INTEGUMENTARY/SKIN: NEGATIVE for worrisome rashes, moles or lesions  EYES: NEGATIVE for vision changes or irritation  ENT/MOUTH: NEGATIVE for ear, mouth and throat problems  RESP:as above  CV: NEGATIVE for chest pain, palpitations or peripheral edema  GI: NEGATIVE for nausea, abdominal pain, heartburn, or change  "in bowel habits  MUSCULOSKELETAL: as above  PSYCHIATRIC: NEGATIVE for changes in mood or affect    OBJECTIVE:     /75 (BP Location: Right arm, Patient Position: Sitting, Cuff Size: Adult Regular)   Pulse 92   Temp 98  F (36.7  C) (Oral)   Ht 1.702 m (5' 7\")   Wt 69.4 kg (153 lb 1.6 oz)   SpO2 97%   BMI 23.98 kg/m    Body mass index is 23.98 kg/m .  GENERAL: healthy, alert and no distress  EYES: Eyes grossly normal to inspection  HENT: ear canals and TM's normal, nose and mouth without ulcers or lesions  NECK: no adenopathy, no asymmetry, masses, or scars and thyroid normal to palpation  RESP: lungs clear to auscultation - no rales, rhonchi or wheezes  CV: regular rate and rhythm, normal S1 S2, no S3 or S4, no murmur, click or rub, no peripheral edema and peripheral pulses strong  MS: no gross musculoskeletal defects noted, no edema  MS: Right ribs-normal on inspection, nontender on palpation, no crepitus  SKIN: no suspicious lesions or rashes  PSYCH: mentation appears normal, affect normal/bright    Diagnostic Test Results:  none     ASSESSMENT/PLAN:             1. Pneumonia of right lower lobe due to infectious organism (H)    2018 December  CT CHEST PULMONARY ANGIO12/14/2018  [facility]   Result Impression   IMPRESSION: No pulmonary embolus. Consolidation within the right middle lobe with patchy groundglass opacities within the lower lobes are consistent with pneumonia.       Reviewed documents and reports from care everywhere  Patient had elevated d-dimer and chest CT showing right lung pneumonia but no concern for PE noted  Patient finished 10-day course of Omnicef and 5-day course of azithromycin with almost near complete resolution of symptoms  Recommended repeat x-ray to confirm resolution of pneumonia  Will f/u on results and call with recommendations.      - XR Chest 2 Views; Future    2. Pleurisy  Recommended patient to take ibuprofen or Aleve over-the-counter as needed, apply local " ice and heat, continue with over-the-counter cough syrup as needed  F/u if no better in 2 weeks or sooner if needed    - XR Chest 2 Views; Future      (J45.30) Mild persistent asthma without complication  Comment:   Plan: Patient is doing well, continue with current inhalers including Qvar daily and albuterol as needed        Chart documentation done in part with Dragon Voice recognition Software. Although reviewed after completion, some word and grammatical error may remain.    See Patient Instructions    Joanne Byrd MD  [facility]

## 2018-12-24 NOTE — RESULT ENCOUNTER NOTE
Dear Katia,  Your chest x-ray is clear with no concern for pneumonia.  Let us continue to monitor, if your symptoms recur or if the rib pain is not any better in 1-2 weeks, please make sure to follow-up.  Let me know if you have any questions. Take care.  Joanne Byrd MD

## 2018-12-25 ASSESSMENT — ASTHMA QUESTIONNAIRES: ACT_TOTALSCORE: 23

## 2019-07-29 ENCOUNTER — OFFICE VISIT (OUTPATIENT)
Dept: PEDIATRICS | Facility: CLINIC | Age: 24
End: 2019-07-29
Payer: COMMERCIAL

## 2019-07-29 VITALS
OXYGEN SATURATION: 99 % | SYSTOLIC BLOOD PRESSURE: 114 MMHG | HEART RATE: 86 BPM | BODY MASS INDEX: 23.51 KG/M2 | DIASTOLIC BLOOD PRESSURE: 81 MMHG | WEIGHT: 150.1 LBS

## 2019-07-29 DIAGNOSIS — B96.89 BACTERIAL VAGINITIS: ICD-10-CM

## 2019-07-29 DIAGNOSIS — Z11.3 SCREEN FOR STD (SEXUALLY TRANSMITTED DISEASE): Primary | ICD-10-CM

## 2019-07-29 DIAGNOSIS — N76.0 BACTERIAL VAGINITIS: ICD-10-CM

## 2019-07-29 PROBLEM — J18.9 PNEUMONIA: Status: ACTIVE | Noted: 2018-12-14

## 2019-07-29 PROBLEM — J18.9 PNEUMONIA: Status: RESOLVED | Noted: 2018-12-14 | Resolved: 2019-07-29

## 2019-07-29 LAB
HCG UR QL: NEGATIVE
SPECIMEN SOURCE: ABNORMAL
WET PREP SPEC: ABNORMAL

## 2019-07-29 PROCEDURE — 87210 SMEAR WET MOUNT SALINE/INK: CPT | Performed by: FAMILY MEDICINE

## 2019-07-29 PROCEDURE — 86803 HEPATITIS C AB TEST: CPT | Performed by: FAMILY MEDICINE

## 2019-07-29 PROCEDURE — 87491 CHLMYD TRACH DNA AMP PROBE: CPT | Performed by: FAMILY MEDICINE

## 2019-07-29 PROCEDURE — 81025 URINE PREGNANCY TEST: CPT | Performed by: FAMILY MEDICINE

## 2019-07-29 PROCEDURE — 86695 HERPES SIMPLEX TYPE 1 TEST: CPT | Performed by: FAMILY MEDICINE

## 2019-07-29 PROCEDURE — 87389 HIV-1 AG W/HIV-1&-2 AB AG IA: CPT | Performed by: FAMILY MEDICINE

## 2019-07-29 PROCEDURE — 86708 HEPATITIS A ANTIBODY: CPT | Performed by: FAMILY MEDICINE

## 2019-07-29 PROCEDURE — 86704 HEP B CORE ANTIBODY TOTAL: CPT | Performed by: FAMILY MEDICINE

## 2019-07-29 PROCEDURE — 86696 HERPES SIMPLEX TYPE 2 TEST: CPT | Performed by: FAMILY MEDICINE

## 2019-07-29 PROCEDURE — 87591 N.GONORRHOEAE DNA AMP PROB: CPT | Performed by: FAMILY MEDICINE

## 2019-07-29 PROCEDURE — 86706 HEP B SURFACE ANTIBODY: CPT | Performed by: FAMILY MEDICINE

## 2019-07-29 PROCEDURE — 99213 OFFICE O/P EST LOW 20 MIN: CPT | Performed by: FAMILY MEDICINE

## 2019-07-29 PROCEDURE — 36415 COLL VENOUS BLD VENIPUNCTURE: CPT | Performed by: FAMILY MEDICINE

## 2019-07-29 PROCEDURE — 87340 HEPATITIS B SURFACE AG IA: CPT | Performed by: FAMILY MEDICINE

## 2019-07-29 PROCEDURE — 86780 TREPONEMA PALLIDUM: CPT | Performed by: FAMILY MEDICINE

## 2019-07-29 RX ORDER — METRONIDAZOLE 500 MG/1
500 TABLET ORAL 2 TIMES DAILY
Qty: 14 TABLET | Refills: 0 | Status: SHIPPED | OUTPATIENT
Start: 2019-07-29 | End: 2019-08-05

## 2019-07-29 NOTE — PROGRESS NOTES
Subjective     Katia Cotto is a 24 year old female who presents to clinic today for the following health issues:    HPI   Patient presents for an STD screen   Her current symptoms are vaginal discharge which cane be bloody, recently switched antibiotics to has noticed changes in her menstrual cycle. She denies  symptoms or pelvic pain.        Reviewed and updated as needed this visit by Provider  Med Hx         Review of Systems   ROS COMP: Constitutional, HEENT, cardiovascular, pulmonary, GI, , musculoskeletal, neuro, skin, endocrine and psych systems are negative, except as otherwise noted.      Objective    /81   Pulse 86   Wt 68.1 kg (150 lb 1.6 oz)   LMP 07/08/2019   SpO2 99%   BMI 23.51 kg/m    Body mass index is 23.51 kg/m .  Physical Exam   GENERAL: healthy, alert and no distress  NECK: no adenopathy, no asymmetry, masses, or scars and thyroid normal to palpation  RESP: lungs clear to auscultation - no rales, rhonchi or wheezes  CV: regular rate and rhythm, normal S1 S2, no S3 or S4, no murmur, click or rub, no peripheral edema and peripheral pulses strong  ABDOMEN: soft, nontender, no hepatosplenomegaly, no masses and bowel sounds normal   (female): normal female external genitalia with brownish discharge          Assessment & Plan     1. Screen for STD (sexually transmitted disease)  - Neisseria gonorrhoeae PCR  - Chlamydia trachomatis PCR  - Hepatitis A Antibody IgG  - Hepatitis B Surface Antibody  - HEPATITIS B CORE ANTIBODY  - HEPATITIS B SURFACE ANTIGEN  - HEPATITS C ANTIBODY  - HIV Antigen Antibody Combo  - Herpes Simplex Virus 1 and 2 IgG  - Treponema Abs w Reflex to RPR and Titer    2. Bacterial vaginitis  - metroNIDAZOLE (FLAGYL) 500 MG tablet; Take 1 tablet (500 mg) by mouth 2 times daily for 7 days  Dispense: 14 tablet; Refill: 0         No follow-ups on file.    Nicohle Mon MD  [facility]

## 2019-07-30 LAB
C TRACH DNA SPEC QL NAA+PROBE: NEGATIVE
HAV IGG SER QL IA: REACTIVE
HBV CORE AB SERPL QL IA: NONREACTIVE
HBV SURFACE AB SERPL IA-ACNC: 0.65 M[IU]/ML
HBV SURFACE AG SERPL QL IA: NONREACTIVE
HCV AB SERPL QL IA: NONREACTIVE
HIV 1+2 AB+HIV1 P24 AG SERPL QL IA: NONREACTIVE
HSV1 IGG SERPL QL IA: <0.2 AI (ref 0–0.8)
HSV2 IGG SERPL QL IA: <0.2 AI (ref 0–0.8)
N GONORRHOEA DNA SPEC QL NAA+PROBE: NEGATIVE
SPECIMEN SOURCE: NORMAL
SPECIMEN SOURCE: NORMAL
T PALLIDUM AB SER QL: NONREACTIVE

## 2020-02-10 ENCOUNTER — HEALTH MAINTENANCE LETTER (OUTPATIENT)
Age: 25
End: 2020-02-10

## 2020-11-16 ENCOUNTER — HEALTH MAINTENANCE LETTER (OUTPATIENT)
Age: 25
End: 2020-11-16

## 2021-04-03 ENCOUNTER — HEALTH MAINTENANCE LETTER (OUTPATIENT)
Age: 26
End: 2021-04-03

## 2021-09-18 ENCOUNTER — HEALTH MAINTENANCE LETTER (OUTPATIENT)
Age: 26
End: 2021-09-18

## 2022-04-24 ENCOUNTER — HEALTH MAINTENANCE LETTER (OUTPATIENT)
Age: 27
End: 2022-04-24

## 2022-11-19 ENCOUNTER — HEALTH MAINTENANCE LETTER (OUTPATIENT)
Age: 27
End: 2022-11-19

## 2023-06-01 ENCOUNTER — HEALTH MAINTENANCE LETTER (OUTPATIENT)
Age: 28
End: 2023-06-01